# Patient Record
(demographics unavailable — no encounter records)

---

## 2025-02-19 NOTE — HISTORY OF PRESENT ILLNESS
[FreeTextEntry1] :  Cardiac risk factors/ diagnoses:   +HTN +Diabetes  Mellitus +Hyperlipidemia -Tobacco Use -Family history premature or other atherosclerotic heart disease  CAD s/p MELONY RCA , LCx [facility] 2014. No recurrent symptoms. Serial imaging PAL [place], no ischemia LEVF normal   Denies chest pain, shortness of breath, orthopnea, PND palpitations or edema.Walks unlimited, rides subway without sx  SLOAN screen + snoring + day time somnolence - non restorative sleep - witnessed apnea

## 2025-02-19 NOTE — ASSESSMENT
[FreeTextEntry1] : EKG NSR PRWP, low voltage  A/P  1. CAD 2. MELONY RCA No sx Repeat nl stress through 2022  Continue risk modification (abnl ekg - consider amyloid eval)  3. HTN BP elevated here By pt report btter (140s/) but not clearly at goal at PMD  Will follow up at PMD, see me next mo If still elevated, ABPM SLOAN screen next visit   DM, type 2, no longg term insuin Hga1C 6.4 asper PMD  4. Hyperlipidemia, target LDL < 70 LDL 27 continue current meds    5.    suspect SLOAN In view of symptoms as listed in HPI, and associated medical and cardiac conditions as noted, HST is indicated to assess for SLOAN.

## 2025-03-21 NOTE — ASSESSMENT
[FreeTextEntry1] : NSR first degree AV block, low voltage PRWP  A/P 1. CAD 2. MELONY RCA No sx Repeat nl stress through 2022  Continue risk modification (abnl ekg - consider amyloid eval)  3. HTN BP still  elevated here By pt report ebtter (140s/) but not clearly at goal at PMD (again 140/85)  echo today nl LV and RV, no LVH (consider also PYP scan next visit in view of low volatge ) SLOAN screen  adjust meds after SLOAN resolved (likely start norvasc 5)  NB creat 1.39    DM, type 2, no long erm insulin Hga1C 6.4 asper PMD  4. Hyperlipidemia, target LDL < 70 LDL 27, last 33 (HDL 31) continue current meds    5. suspect SLOAN In view of symptoms as listed in HPI, and associated medical and cardiac conditions as noted, HST is indicated to assess for SLOAN.

## 2025-03-21 NOTE — HISTORY OF PRESENT ILLNESS
[FreeTextEntry1] : Cardiac risk factors/ diagnoses:   +HTN +Diabetes Mellitus +Hyperlipidemia -Tobacco Use -Family history premature or other atherosclerotic heart disease  CAD s/p MELONY RCA , LCx [facility] 2014. No recurrent symptoms. Serial imaging R [place], no ischemia LVEF normal Denies chest pain, shortness of breath, orthopnea, PND palpitations or edema.Walks unlimited, rides subway without sx  SLOAN screen + snoring + day time somnolence - non restorative sleep - witnessed apnea

## 2025-04-25 NOTE — HISTORY OF PRESENT ILLNESS
[FreeTextEntry1] : 71 M f/u CAD, HTN Cardiac risk factors/ diagnoses:   +HTN +Diabetes Mellitus +Hyperlipidemia -Tobacco Use -Family history premature or other atherosclerotic heart disease  CAD s/p MELONY RCA , LCx [facility] 2014. No recurrent symptoms. Serial imaging R [place], no ischemia LVEF normal Denies chest pain, shortness of breath, orthopnea, PND palpitations or edema.Walks unlimited, rides subway without sx  SLOAN screen + snoring + day time somnolence - non restorative sleep - witnessed apnea

## 2025-04-25 NOTE — ASSESSMENT
[FreeTextEntry1] : CONCLUSIONS:  1. Left ventricular cavity is normal in size. Left ventricular wall thickness is normal. Left ventricular systolic function is hyperdynamic with an ejection fraction of 71 % by Perkins's method of disks. There are no regional wall motion abnormalities seen. 2. Normal left ventricular diastolic function. 3. Normal right ventricular cavity size, with normal wall thickness, and normal right ventricular systolic function.  NSR first degree AV block, low voltage IWMI PRWP (no change) A/P 1. CAD 2. MELONY RCA No sx Repeat nl stress through 2022  Continue risk modification (abnl ekg - consider amyloid eval)  3. HTN BP still elevated here By pt report better (140s/) but not clearly at goal at PMD (again 140/85)  echo last visit nl LV and RV, no LVH GFR 55, creat 1.39 (consider also PYP scan next visit in view of low voltage ) SLOAN screen as noted mild - mod SLOAN adjust meds after SLOAN resolved (likely start norvasc 5) NB creat 1.39   4. DM, type 2, no long erm insulin Hga1C 6.4 asper PMD  5. Hyperlipidemia, target LDL < 70 LDL 27, last 33 (HDL 31) continue current meds  6. SLOAN  HST as noted refer Pulm